# Patient Record
Sex: FEMALE | Race: WHITE | Employment: FULL TIME | URBAN - METROPOLITAN AREA
[De-identification: names, ages, dates, MRNs, and addresses within clinical notes are randomized per-mention and may not be internally consistent; named-entity substitution may affect disease eponyms.]

---

## 2018-09-28 ENCOUNTER — OFFICE VISIT (OUTPATIENT)
Dept: INTERNAL MEDICINE CLINIC | Facility: CLINIC | Age: 43
End: 2018-09-28
Payer: COMMERCIAL

## 2018-09-28 ENCOUNTER — LAB ENCOUNTER (OUTPATIENT)
Dept: LAB | Age: 43
End: 2018-09-28
Attending: FAMILY MEDICINE
Payer: COMMERCIAL

## 2018-09-28 ENCOUNTER — HOSPITAL ENCOUNTER (OUTPATIENT)
Dept: GENERAL RADIOLOGY | Age: 43
Discharge: HOME | End: 2018-09-28
Attending: FAMILY MEDICINE
Payer: COMMERCIAL

## 2018-09-28 VITALS
SYSTOLIC BLOOD PRESSURE: 126 MMHG | WEIGHT: 117 LBS | RESPIRATION RATE: 16 BRPM | HEART RATE: 74 BPM | TEMPERATURE: 98 F | HEIGHT: 63 IN | DIASTOLIC BLOOD PRESSURE: 70 MMHG | BODY MASS INDEX: 20.73 KG/M2

## 2018-09-28 DIAGNOSIS — R42 VERTIGO: ICD-10-CM

## 2018-09-28 DIAGNOSIS — M54.2 NECK PAIN: ICD-10-CM

## 2018-09-28 DIAGNOSIS — R53.83 FATIGUE, UNSPECIFIED TYPE: ICD-10-CM

## 2018-09-28 DIAGNOSIS — M54.2 NECK PAIN: Primary | ICD-10-CM

## 2018-09-28 DIAGNOSIS — Z13.220 SCREENING, LIPID: ICD-10-CM

## 2018-09-28 DIAGNOSIS — R51.9 FREQUENT HEADACHES: ICD-10-CM

## 2018-09-28 DIAGNOSIS — R20.2 TINGLING IN EXTREMITIES: ICD-10-CM

## 2018-09-28 LAB
ALBUMIN SERPL-MCNC: 4.3 G/DL (ref 3.5–4.8)
ALBUMIN/GLOB SERPL: 1.4 {RATIO} (ref 1–2)
ALP LIVER SERPL-CCNC: 44 U/L (ref 37–98)
ALT SERPL-CCNC: 16 U/L (ref 14–54)
ANION GAP SERPL CALC-SCNC: 6 MMOL/L (ref 0–18)
AST SERPL-CCNC: 15 U/L (ref 15–41)
BASOPHILS # BLD AUTO: 0.03 X10(3) UL (ref 0–0.1)
BASOPHILS NFR BLD AUTO: 0.6 %
BILIRUB SERPL-MCNC: 0.5 MG/DL (ref 0.1–2)
BUN BLD-MCNC: 10 MG/DL (ref 8–20)
BUN/CREAT SERPL: 12.8 (ref 10–20)
CALCIUM BLD-MCNC: 9.1 MG/DL (ref 8.3–10.3)
CHLORIDE SERPL-SCNC: 106 MMOL/L (ref 101–111)
CHOLEST SMN-MCNC: 188 MG/DL (ref ?–200)
CO2 SERPL-SCNC: 28 MMOL/L (ref 22–32)
CREAT BLD-MCNC: 0.78 MG/DL (ref 0.55–1.02)
EOSINOPHIL # BLD AUTO: 0.27 X10(3) UL (ref 0–0.3)
EOSINOPHIL NFR BLD AUTO: 5.5 %
ERYTHROCYTE [DISTWIDTH] IN BLOOD BY AUTOMATED COUNT: 11.2 % (ref 11.5–16)
FOLATE SERPL-MCNC: 11.8 NG/ML (ref 5.9–?)
GLOBULIN PLAS-MCNC: 3 G/DL (ref 2.5–4)
GLUCOSE BLD-MCNC: 96 MG/DL (ref 70–99)
HAV AB SERPL IA-ACNC: 394 PG/ML (ref 193–986)
HCT VFR BLD AUTO: 40.1 % (ref 34–50)
HDLC SERPL-MCNC: 68 MG/DL (ref 40–59)
HGB BLD-MCNC: 13.5 G/DL (ref 12–16)
IMMATURE GRANULOCYTE COUNT: 0.02 X10(3) UL (ref 0–1)
IMMATURE GRANULOCYTE RATIO %: 0.4 %
LDLC SERPL CALC-MCNC: 108 MG/DL (ref ?–100)
LYMPHOCYTES # BLD AUTO: 1.43 X10(3) UL (ref 0.9–4)
LYMPHOCYTES NFR BLD AUTO: 28.9 %
M PROTEIN MFR SERPL ELPH: 7.3 G/DL (ref 6.1–8.3)
MCH RBC QN AUTO: 31.4 PG (ref 27–33.2)
MCHC RBC AUTO-ENTMCNC: 33.7 G/DL (ref 31–37)
MCV RBC AUTO: 93.3 FL (ref 81–100)
MONOCYTES # BLD AUTO: 0.32 X10(3) UL (ref 0.1–1)
MONOCYTES NFR BLD AUTO: 6.5 %
NEUTROPHIL ABS PRELIM: 2.87 X10 (3) UL (ref 1.3–6.7)
NEUTROPHILS # BLD AUTO: 2.87 X10(3) UL (ref 1.3–6.7)
NEUTROPHILS NFR BLD AUTO: 58.1 %
NONHDLC SERPL-MCNC: 120 MG/DL (ref ?–130)
OSMOLALITY SERPL CALC.SUM OF ELEC: 289 MOSM/KG (ref 275–295)
PLATELET # BLD AUTO: 253 10(3)UL (ref 150–450)
POTASSIUM SERPL-SCNC: 4.8 MMOL/L (ref 3.6–5.1)
RBC # BLD AUTO: 4.3 X10(6)UL (ref 3.8–5.1)
RED CELL DISTRIBUTION WIDTH-SD: 37.7 FL (ref 35.1–46.3)
SODIUM SERPL-SCNC: 140 MMOL/L (ref 136–144)
TRIGL SERPL-MCNC: 61 MG/DL (ref 30–149)
TSI SER-ACNC: 2.02 MIU/ML (ref 0.35–5.5)
VIT D+METAB SERPL-MCNC: 36.1 NG/ML (ref 30–100)
VLDLC SERPL CALC-MCNC: 12 MG/DL (ref 0–30)
WBC # BLD AUTO: 4.9 X10(3) UL (ref 4–13)

## 2018-09-28 PROCEDURE — 82306 VITAMIN D 25 HYDROXY: CPT | Performed by: FAMILY MEDICINE

## 2018-09-28 PROCEDURE — 82746 ASSAY OF FOLIC ACID SERUM: CPT | Performed by: FAMILY MEDICINE

## 2018-09-28 PROCEDURE — 99204 OFFICE O/P NEW MOD 45 MIN: CPT | Performed by: FAMILY MEDICINE

## 2018-09-28 PROCEDURE — 82607 VITAMIN B-12: CPT | Performed by: FAMILY MEDICINE

## 2018-09-28 PROCEDURE — 72052 X-RAY EXAM NECK SPINE 6/>VWS: CPT | Performed by: FAMILY MEDICINE

## 2018-09-28 PROCEDURE — 80050 GENERAL HEALTH PANEL: CPT | Performed by: FAMILY MEDICINE

## 2018-09-28 PROCEDURE — 80061 LIPID PANEL: CPT | Performed by: FAMILY MEDICINE

## 2018-09-28 RX ORDER — METHOCARBAMOL 750 MG/1
750 TABLET, FILM COATED ORAL 4 TIMES DAILY
Qty: 30 TABLET | Refills: 0 | Status: SHIPPED | OUTPATIENT
Start: 2018-09-28 | End: 2020-01-03 | Stop reason: ALTCHOICE

## 2018-09-28 RX ORDER — MECLIZINE HYDROCHLORIDE 25 MG/1
25 TABLET ORAL 3 TIMES DAILY PRN
Qty: 30 TABLET | Refills: 0 | Status: SHIPPED | OUTPATIENT
Start: 2018-09-28 | End: 2020-01-03 | Stop reason: ALTCHOICE

## 2018-09-28 RX ORDER — METHYLPREDNISOLONE 4 MG/1
TABLET ORAL
Qty: 1 KIT | Refills: 0 | Status: SHIPPED | OUTPATIENT
Start: 2018-09-28 | End: 2019-12-06

## 2018-09-28 NOTE — PROGRESS NOTES
HPI:    Patient ID: Edelmira Green is a 37year old female. HPI Here with multiple complaints. Patient does not see PCP but does see Gyne regularly.  Is up to date per patient on pap and mammogram.   Works as dental hygienist and notices for the [employer] Tobacco comment: 4 cigarettes daily     Substance and Sexual Activity      Alcohol use: No      Drug use: No      Sexual activity: Not on file    Other Topics      Concerns:         Service: Not Asked        Blood Transfusions: Not Asked Allergies:  Sulfa Drugs Cross R*    DIARRHEA    Comment:Abdominal pain   PHYSICAL EXAM:   Physical Exam   Constitutional: She appears well-developed and well-nourished. HENT:   Head: Normocephalic and atraumatic.    Right Ear: Tympanic membrane, external Vitamin D, 25-Hydroxy      Vitamin J11 [E]      Folic Acid Serum [E]      Meds This Visit:  Requested Prescriptions     Signed Prescriptions Disp Refills   • methylPREDNISolone (MEDROL) 4 MG Oral Tablet Therapy Pack 1 kit 0     Sig: As directed.    • me

## 2018-10-01 ENCOUNTER — TELEPHONE (OUTPATIENT)
Dept: INTERNAL MEDICINE CLINIC | Facility: CLINIC | Age: 43
End: 2018-10-01

## 2018-10-01 NOTE — TELEPHONE ENCOUNTER
Called pt back at work as requested, informed she was out to lunch. [facility] Regarding non-urgent results.

## 2018-10-01 NOTE — TELEPHONE ENCOUNTER
Called pt, advised that [facility] has not reviewed her results yet and we will call once her results have been reviewed. Pt verbalized understanding.

## 2018-10-01 NOTE — TELEPHONE ENCOUNTER
Please call patient.  Labs are normal and x-ray just shows some likely muscle strain otherwise normal. Does she want to try PT?

## 2020-01-03 PROBLEM — J18.9 COMMUNITY ACQUIRED PNEUMONIA OF LEFT LUNG, UNSPECIFIED PART OF LUNG: Status: ACTIVE | Noted: 2020-01-03

## 2020-01-03 PROBLEM — R06.02 SOB (SHORTNESS OF BREATH): Status: ACTIVE | Noted: 2020-01-03

## 2020-01-03 PROBLEM — F17.210 CIGARETTE NICOTINE DEPENDENCE WITHOUT COMPLICATION: Status: ACTIVE | Noted: 2020-01-03

## 2020-01-03 PROBLEM — R05.9 COUGH: Status: ACTIVE | Noted: 2020-01-03

## 2020-02-19 PROBLEM — R06.02 SOB (SHORTNESS OF BREATH): Status: RESOLVED | Noted: 2020-01-03 | Resolved: 2020-02-19

## 2020-02-19 PROBLEM — J18.9 COMMUNITY ACQUIRED PNEUMONIA OF LEFT LUNG, UNSPECIFIED PART OF LUNG: Status: RESOLVED | Noted: 2020-01-03 | Resolved: 2020-02-19

## 2020-08-24 PROBLEM — K58.0 IRRITABLE BOWEL SYNDROME WITH DIARRHEA: Status: ACTIVE | Noted: 2020-08-24

## 2024-12-03 RX ORDER — IBUPROFEN 200 MG
200 TABLET ORAL EVERY 6 HOURS PRN
COMMUNITY

## 2024-12-03 RX ORDER — PREDNISONE 20 MG/1
40 TABLET ORAL DAILY
COMMUNITY
Start: 2024-12-02 | End: 2024-12-23

## 2024-12-03 RX ORDER — ALBUTEROL SULFATE 90 UG/1
2 INHALANT RESPIRATORY (INHALATION) EVERY 6 HOURS PRN
COMMUNITY
Start: 2024-12-02

## 2024-12-03 RX ORDER — IPRATROPIUM BROMIDE AND ALBUTEROL SULFATE 2.5; .5 MG/3ML; MG/3ML
3 SOLUTION RESPIRATORY (INHALATION) 4 TIMES DAILY PRN
COMMUNITY
Start: 2024-12-02

## 2024-12-03 RX ORDER — GARLIC EXTRACT 500 MG
1 CAPSULE ORAL DAILY
COMMUNITY

## 2024-12-06 ENCOUNTER — LABORATORY ENCOUNTER (OUTPATIENT)
Dept: LAB | Age: 49
End: 2024-12-06
Attending: OBSTETRICS & GYNECOLOGY
Payer: COMMERCIAL

## 2024-12-06 DIAGNOSIS — N94.6 DYSMENORRHEA: ICD-10-CM

## 2024-12-06 DIAGNOSIS — Z01.818 PRE-OP TESTING: ICD-10-CM

## 2024-12-06 LAB
ANTIBODY SCREEN: NEGATIVE
BASOPHILS # BLD AUTO: 0.02 X10(3) UL (ref 0–0.2)
BASOPHILS NFR BLD AUTO: 0.1 %
EOSINOPHIL # BLD AUTO: 0.01 X10(3) UL (ref 0–0.7)
EOSINOPHIL NFR BLD AUTO: 0.1 %
ERYTHROCYTE [DISTWIDTH] IN BLOOD BY AUTOMATED COUNT: 11.5 %
HCT VFR BLD AUTO: 37.1 %
HGB BLD-MCNC: 12.6 G/DL
IMM GRANULOCYTES # BLD AUTO: 0.1 X10(3) UL (ref 0–1)
IMM GRANULOCYTES NFR BLD: 0.7 %
LYMPHOCYTES # BLD AUTO: 0.94 X10(3) UL (ref 1–4)
LYMPHOCYTES NFR BLD AUTO: 7 %
MCH RBC QN AUTO: 32.1 PG (ref 26–34)
MCHC RBC AUTO-ENTMCNC: 34 G/DL (ref 31–37)
MCV RBC AUTO: 94.6 FL
MONOCYTES # BLD AUTO: 0.12 X10(3) UL (ref 0.1–1)
MONOCYTES NFR BLD AUTO: 0.9 %
NEUTROPHILS # BLD AUTO: 12.24 X10 (3) UL (ref 1.5–7.7)
NEUTROPHILS # BLD AUTO: 12.24 X10(3) UL (ref 1.5–7.7)
NEUTROPHILS NFR BLD AUTO: 91.2 %
PLATELET # BLD AUTO: 304 10(3)UL (ref 150–450)
RBC # BLD AUTO: 3.92 X10(6)UL
RH BLOOD TYPE: POSITIVE
WBC # BLD AUTO: 13.4 X10(3) UL (ref 4–11)

## 2024-12-06 PROCEDURE — 36415 COLL VENOUS BLD VENIPUNCTURE: CPT

## 2024-12-06 PROCEDURE — 85025 COMPLETE CBC W/AUTO DIFF WBC: CPT

## 2024-12-06 PROCEDURE — 86900 BLOOD TYPING SEROLOGIC ABO: CPT

## 2024-12-06 PROCEDURE — 86850 RBC ANTIBODY SCREEN: CPT

## 2024-12-06 PROCEDURE — 86901 BLOOD TYPING SEROLOGIC RH(D): CPT

## 2024-12-23 ENCOUNTER — HOSPITAL ENCOUNTER (OUTPATIENT)
Facility: HOSPITAL | Age: 49
Setting detail: HOSPITAL OUTPATIENT SURGERY
Discharge: HOME | End: 2024-12-23
Attending: OBSTETRICS & GYNECOLOGY | Admitting: OBSTETRICS & GYNECOLOGY
Payer: COMMERCIAL

## 2024-12-23 ENCOUNTER — ANESTHESIA EVENT (OUTPATIENT)
Dept: SURGERY | Facility: HOSPITAL | Age: 49
End: 2024-12-23
Payer: COMMERCIAL

## 2024-12-23 ENCOUNTER — ANESTHESIA (OUTPATIENT)
Dept: SURGERY | Facility: HOSPITAL | Age: 49
End: 2024-12-23
Payer: COMMERCIAL

## 2024-12-23 VITALS
RESPIRATION RATE: 13 BRPM | DIASTOLIC BLOOD PRESSURE: 92 MMHG | BODY MASS INDEX: 21.26 KG/M2 | OXYGEN SATURATION: 100 % | HEIGHT: 63 IN | TEMPERATURE: 99 F | SYSTOLIC BLOOD PRESSURE: 106 MMHG | HEART RATE: 91 BPM | WEIGHT: 120 LBS

## 2024-12-23 DIAGNOSIS — N94.6 DYSMENORRHEA: Primary | ICD-10-CM

## 2024-12-23 DIAGNOSIS — Z90.721 S/P HYSTERECTOMY WITH OOPHORECTOMY: ICD-10-CM

## 2024-12-23 DIAGNOSIS — Z90.710 S/P HYSTERECTOMY WITH OOPHORECTOMY: ICD-10-CM

## 2024-12-23 DIAGNOSIS — Z01.818 PRE-OP TESTING: ICD-10-CM

## 2024-12-23 LAB
B-HCG UR QL: NEGATIVE
RH BLOOD TYPE: POSITIVE

## 2024-12-23 PROCEDURE — 8E0W4CZ ROBOTIC ASSISTED PROCEDURE OF TRUNK REGION, PERCUTANEOUS ENDOSCOPIC APPROACH: ICD-10-PCS | Performed by: OBSTETRICS & GYNECOLOGY

## 2024-12-23 PROCEDURE — 0UT74ZZ RESECTION OF BILATERAL FALLOPIAN TUBES, PERCUTANEOUS ENDOSCOPIC APPROACH: ICD-10-PCS | Performed by: OBSTETRICS & GYNECOLOGY

## 2024-12-23 PROCEDURE — 0UT94ZZ RESECTION OF UTERUS, PERCUTANEOUS ENDOSCOPIC APPROACH: ICD-10-PCS | Performed by: OBSTETRICS & GYNECOLOGY

## 2024-12-23 PROCEDURE — 88307 TISSUE EXAM BY PATHOLOGIST: CPT | Performed by: OBSTETRICS & GYNECOLOGY

## 2024-12-23 PROCEDURE — 81025 URINE PREGNANCY TEST: CPT

## 2024-12-23 PROCEDURE — 0UT24ZZ RESECTION OF BILATERAL OVARIES, PERCUTANEOUS ENDOSCOPIC APPROACH: ICD-10-PCS | Performed by: OBSTETRICS & GYNECOLOGY

## 2024-12-23 RX ORDER — HYDROMORPHONE HYDROCHLORIDE 1 MG/ML
0.2 INJECTION, SOLUTION INTRAMUSCULAR; INTRAVENOUS; SUBCUTANEOUS EVERY 5 MIN PRN
Status: DISCONTINUED | OUTPATIENT
Start: 2024-12-23 | End: 2024-12-23

## 2024-12-23 RX ORDER — HYDROCODONE BITARTRATE AND ACETAMINOPHEN 5; 325 MG/1; MG/1
1 TABLET ORAL ONCE AS NEEDED
Status: DISCONTINUED | OUTPATIENT
Start: 2024-12-23 | End: 2024-12-23

## 2024-12-23 RX ORDER — HYDROMORPHONE HYDROCHLORIDE 1 MG/ML
0.4 INJECTION, SOLUTION INTRAMUSCULAR; INTRAVENOUS; SUBCUTANEOUS EVERY 5 MIN PRN
Status: DISCONTINUED | OUTPATIENT
Start: 2024-12-23 | End: 2024-12-23

## 2024-12-23 RX ORDER — LIDOCAINE HYDROCHLORIDE 10 MG/ML
INJECTION, SOLUTION EPIDURAL; INFILTRATION; INTRACAUDAL; PERINEURAL AS NEEDED
Status: DISCONTINUED | OUTPATIENT
Start: 2024-12-23 | End: 2024-12-23 | Stop reason: SURG

## 2024-12-23 RX ORDER — HYDROCODONE BITARTRATE AND ACETAMINOPHEN 5; 325 MG/1; MG/1
2 TABLET ORAL ONCE AS NEEDED
Status: DISCONTINUED | OUTPATIENT
Start: 2024-12-23 | End: 2024-12-23

## 2024-12-23 RX ORDER — DEXAMETHASONE SODIUM PHOSPHATE 4 MG/ML
VIAL (ML) INJECTION AS NEEDED
Status: DISCONTINUED | OUTPATIENT
Start: 2024-12-23 | End: 2024-12-23 | Stop reason: SURG

## 2024-12-23 RX ORDER — MIDAZOLAM HYDROCHLORIDE 1 MG/ML
INJECTION INTRAMUSCULAR; INTRAVENOUS AS NEEDED
Status: DISCONTINUED | OUTPATIENT
Start: 2024-12-23 | End: 2024-12-23 | Stop reason: SURG

## 2024-12-23 RX ORDER — ONDANSETRON 2 MG/ML
INJECTION INTRAMUSCULAR; INTRAVENOUS AS NEEDED
Status: DISCONTINUED | OUTPATIENT
Start: 2024-12-23 | End: 2024-12-23 | Stop reason: SURG

## 2024-12-23 RX ORDER — KETOROLAC TROMETHAMINE 30 MG/ML
INJECTION, SOLUTION INTRAMUSCULAR; INTRAVENOUS AS NEEDED
Status: DISCONTINUED | OUTPATIENT
Start: 2024-12-23 | End: 2024-12-23 | Stop reason: SURG

## 2024-12-23 RX ORDER — PROCHLORPERAZINE EDISYLATE 5 MG/ML
5 INJECTION INTRAMUSCULAR; INTRAVENOUS EVERY 8 HOURS PRN
Status: DISCONTINUED | OUTPATIENT
Start: 2024-12-23 | End: 2024-12-23

## 2024-12-23 RX ORDER — ACETAMINOPHEN 500 MG
1000 TABLET ORAL ONCE
Status: DISCONTINUED | OUTPATIENT
Start: 2024-12-23 | End: 2024-12-23 | Stop reason: HOSPADM

## 2024-12-23 RX ORDER — SODIUM CHLORIDE, SODIUM LACTATE, POTASSIUM CHLORIDE, CALCIUM CHLORIDE 600; 310; 30; 20 MG/100ML; MG/100ML; MG/100ML; MG/100ML
INJECTION, SOLUTION INTRAVENOUS CONTINUOUS
Status: DISCONTINUED | OUTPATIENT
Start: 2024-12-23 | End: 2024-12-23

## 2024-12-23 RX ORDER — HEPARIN SODIUM 5000 [USP'U]/ML
5000 INJECTION, SOLUTION INTRAVENOUS; SUBCUTANEOUS ONCE
Status: COMPLETED | OUTPATIENT
Start: 2024-12-23 | End: 2024-12-23

## 2024-12-23 RX ORDER — OXYCODONE HYDROCHLORIDE 5 MG/1
5 TABLET ORAL EVERY 4 HOURS PRN
Qty: 10 TABLET | Refills: 0 | Status: SHIPPED | OUTPATIENT
Start: 2024-12-23

## 2024-12-23 RX ORDER — ACETAMINOPHEN 500 MG
1000 TABLET ORAL ONCE AS NEEDED
Status: DISCONTINUED | OUTPATIENT
Start: 2024-12-23 | End: 2024-12-23

## 2024-12-23 RX ORDER — HYDROMORPHONE HYDROCHLORIDE 1 MG/ML
0.6 INJECTION, SOLUTION INTRAMUSCULAR; INTRAVENOUS; SUBCUTANEOUS EVERY 5 MIN PRN
Status: DISCONTINUED | OUTPATIENT
Start: 2024-12-23 | End: 2024-12-23

## 2024-12-23 RX ORDER — ONDANSETRON 2 MG/ML
4 INJECTION INTRAMUSCULAR; INTRAVENOUS EVERY 6 HOURS PRN
Status: DISCONTINUED | OUTPATIENT
Start: 2024-12-23 | End: 2024-12-23

## 2024-12-23 RX ORDER — NALOXONE HYDROCHLORIDE 0.4 MG/ML
0.08 INJECTION, SOLUTION INTRAMUSCULAR; INTRAVENOUS; SUBCUTANEOUS AS NEEDED
Status: DISCONTINUED | OUTPATIENT
Start: 2024-12-23 | End: 2024-12-23

## 2024-12-23 RX ORDER — HYDROMORPHONE HYDROCHLORIDE 1 MG/ML
INJECTION, SOLUTION INTRAMUSCULAR; INTRAVENOUS; SUBCUTANEOUS
Status: COMPLETED
Start: 2024-12-23 | End: 2024-12-23

## 2024-12-23 RX ORDER — SODIUM CHLORIDE 9 MG/ML
INJECTION, SOLUTION INTRAVENOUS CONTINUOUS PRN
Status: DISCONTINUED | OUTPATIENT
Start: 2024-12-23 | End: 2024-12-23 | Stop reason: SURG

## 2024-12-23 RX ORDER — ESTRADIOL 0.05 MG/D
1 PATCH TRANSDERMAL WEEKLY
Qty: 12.85 PATCH | Refills: 3 | Status: SHIPPED | OUTPATIENT
Start: 2024-12-23 | End: 2025-12-23

## 2024-12-23 RX ORDER — ROCURONIUM BROMIDE 10 MG/ML
INJECTION, SOLUTION INTRAVENOUS AS NEEDED
Status: DISCONTINUED | OUTPATIENT
Start: 2024-12-23 | End: 2024-12-23 | Stop reason: SURG

## 2024-12-23 RX ORDER — SCOLOPAMINE TRANSDERMAL SYSTEM 1 MG/1
1 PATCH, EXTENDED RELEASE TRANSDERMAL ONCE
Status: DISCONTINUED | OUTPATIENT
Start: 2024-12-23 | End: 2024-12-23

## 2024-12-23 RX ORDER — BUPIVACAINE HYDROCHLORIDE 2.5 MG/ML
INJECTION, SOLUTION EPIDURAL; INFILTRATION; INTRACAUDAL AS NEEDED
Status: DISCONTINUED | OUTPATIENT
Start: 2024-12-23 | End: 2024-12-23

## 2024-12-23 RX ADMIN — SODIUM CHLORIDE: 9 INJECTION, SOLUTION INTRAVENOUS at 07:29:00

## 2024-12-23 RX ADMIN — KETOROLAC TROMETHAMINE 30 MG: 30 INJECTION, SOLUTION INTRAMUSCULAR; INTRAVENOUS at 09:12:00

## 2024-12-23 RX ADMIN — ROCURONIUM BROMIDE 50 MG: 10 INJECTION, SOLUTION INTRAVENOUS at 07:33:00

## 2024-12-23 RX ADMIN — MIDAZOLAM HYDROCHLORIDE 2 MG: 1 INJECTION INTRAMUSCULAR; INTRAVENOUS at 07:29:00

## 2024-12-23 RX ADMIN — SODIUM CHLORIDE: 9 INJECTION, SOLUTION INTRAVENOUS at 09:14:00

## 2024-12-23 RX ADMIN — ROCURONIUM BROMIDE 20 MG: 10 INJECTION, SOLUTION INTRAVENOUS at 08:34:00

## 2024-12-23 RX ADMIN — LIDOCAINE HYDROCHLORIDE 50 MG: 10 INJECTION, SOLUTION EPIDURAL; INFILTRATION; INTRACAUDAL; PERINEURAL at 07:33:00

## 2024-12-23 RX ADMIN — ONDANSETRON 4 MG: 2 INJECTION INTRAMUSCULAR; INTRAVENOUS at 09:10:00

## 2024-12-23 RX ADMIN — DEXAMETHASONE SODIUM PHOSPHATE 8 MG: 4 MG/ML VIAL (ML) INJECTION at 07:48:00

## 2024-12-23 NOTE — H&P
HPI:  Pt with menometrorrhagia, dysmenorrhea, history of endometriosis and failed endometrial ablation. Here for hysterectomy  SIGNIFICANT HISTORY:  HISTORY:  Past Medical History:    Asthma (HCC)    Endometriosis    IBS (irritable bowel syndrome)    PONV (postoperative nausea and vomiting)      Past Surgical History:   Procedure Laterality Date    Colonoscopy      Endometrial ablation      Tonsillectomy      Tubal ligation        Family History   Problem Relation Age of Onset    Other (cirrhosis) Father     Heart Disease Paternal Grandfather       Social History     Socioeconomic History    Marital status:    Tobacco Use    Smoking status: Every Day     Types: Cigarettes    Smokeless tobacco: Never    Tobacco comments:     4 cigarettes daily    Vaping Use    Vaping status: Never Used   Substance and Sexual Activity    Alcohol use: No     Comment: rare    Drug use: No   Other Topics Concern    Caffeine Concern Yes    Exercise Yes        Past Surgical History:   Procedure Laterality Date    Colonoscopy      Endometrial ablation      Tonsillectomy      Tubal ligation       OB History    Para Term  AB Living   4 3 2 1 1 3   SAB IAB Ectopic Multiple Live Births   0 0 0 0 0     Social History     Socioeconomic History    Marital status:    Tobacco Use    Smoking status: Every Day     Types: Cigarettes    Smokeless tobacco: Never    Tobacco comments:     4 cigarettes daily    Vaping Use    Vaping status: Never Used   Substance and Sexual Activity    Alcohol use: No     Comment: rare    Drug use: No   Other Topics Concern    Caffeine Concern Yes    Exercise Yes         ROS:  GENERAL HEALTH: feels well otherwise  GI: denies nausea, vomiting, constipation, diarrhea; no rectal bleeding; no heartburn  GYNE/: no dysuria, urgency or frequency; no hx abnormal pap smear; no vaginal discharge; no dyspareunia; periods regular; no urinary incontinence  MUSCULOSKELETAL: no joint complaints upper or lower  extremities  PSYCHE: no symptoms of depression or anxiety  HEMATOLOGY: denies hx anemia; denies bruising or excessive bleeding  ENDOCRINE:  denies unexpected wt gain or wt loss  ALLERGY/IMM.: denies food or seasonal allergies    PHYSICAL EXAM:  GENERAL: well developed, well nourished, in no apparent distress  SKIN: no rashes, no suspicious lesions  HEENT: normal  LUNGS: clear to auscultation  CARDIOVASCULAR: normal S1, S2, RRR  ABDOMEN: Soft, non distended; non tender, no masses  GYNE/: External Genitalia: Normal                      Vagina: normal                      Uterus: av, mobile, non tender, small                     Cervix: pink and clear, no lesions                     Adnexa: non tender, no masses  EXTREMITIES:  non tender without edema    Imaging:  Pelvic ultrasound 11/24-  UTERUS:    The uterus is anteverted and measures 7.2 x 3.9 x 3.6 cm. Total volume is 52.9 mL.   The myometrium is homogeneous.   The endometrial echo complex measures up to 5.0 mm. Numerous calcifications seen within the lower   endometrium.     RIGHT OVARY:   The right ovary measures 2.3 x 1.1 x 1.6 cm. Total volume is 2.1 mL.        LEFT OVARY:   The left ovary measures 1.7 x 1.0 x 1.4 cm. Total volume is 1.3 mL.       There is no significant free fluid.     ASSESMENT/PLAN:  1. Surgery to be performed: robotic assisted total laparoscopic hysterectomy, bilat oophorectomy  2. Indication:menometrorrhagia, dysmenorrhea, history of endometriosis  3. The procedure, its risks, benefits, possible complications and alternatives discussed with the patient.  She understands and agrees to the procedure.      Id#[id number]

## 2024-12-23 NOTE — ANESTHESIA POSTPROCEDURE EVALUATION
[facility]    Karolina Hernandezmantle Patient Status:  Hospital Outpatient Surgery   Age/Gender 49 year old female MRN [id number]   Location [facility] POST ANESTHESIA CARE UNIT Attending Yuan Subramanian MD   Hosp Day # 0 PCP Diane Casey MD       Anesthesia Post-op Note    XI ROBOT-ASSISTED TOTAL LAPAROSCOPIC HYSTERECTOMY BILATERAL OOPHORECTOMY, bilateral partial salpingectomy    Procedure Summary       Date: 12/23/24 Room / Location:  MAIN OR 09 / [facility] MAIN OR    Anesthesia Start: 0729 Anesthesia Stop: 0934    Procedure: XI ROBOT-ASSISTED TOTAL LAPAROSCOPIC HYSTERECTOMY BILATERAL OOPHORECTOMY, bilateral partial salpingectomy (Bilateral: Uterus) Diagnosis: (MENOMETRORRAHIADYSMENORRHEAHISTORY OF ENDOMETRIOSIS)    Surgeons: Yuan Subramanian MD Anesthesiologist: Casey Hong MD    Anesthesia Type: general ASA Status: 2            Anesthesia Type: general    Vitals Value Taken Time   /86 12/23/24 0946   Temp 98.5 °F (36.9 °C) 12/23/24 0946   Pulse 101 12/23/24 0946   Resp 18 12/23/24 0946   SpO2 100 % 12/23/24 0946       Patient Location: PACU    Anesthesia Type: general    Airway Patency: patent    Postop Pain Control: adequate    Mental Status: preanesthetic baseline    Nausea/Vomiting: none    Cardiopulmonary/Hydration status: stable euvolemic    Complications: no apparent anesthesia related complications    Postop vital signs: stable    Dental Exam: Unchanged from Preop

## 2024-12-23 NOTE — OPERATIVE REPORT
OPERATIVE REPORT   DATE OF SURGERY: 12/23/24  PREOPERATIVE DIAGNOSIS:  menometrorrhagia, dysmenorrhea, endometriosis, failed endometrial ablation  POSTOPERATIVE DIAGNOSIS:  same  PROCEDURE PERFORMED: Robotically assisted total laparoscopic hysterectomy, bilateral salpingectomies  SURGEON; Moris  ASSISTANT:  LUCILLE Borja  ANESTHESIA: General.   FINDINGS: Uterus is approximately 8 week size.   The tubes were partially resected and ovaries were normal bilaterally. The peritoneum was normal except small endometriosis implant in R ovarian fossa including pelvic, veiscouterine and cul de sac.    TECHNIQUE: The surgical assist, Dr. Borja, was an integral part of the procedure. They were necessary to provide a safe outcome and to aid in adequate hemostasis.The patient was prepped and draped in a sterile fashion after satisfactory general anesthesia was given. Patient was examined. A Torrez was placed in the bladder. An advincula manipulator was inserted into the uterus and secured with an 0 Vicryl suture.  A stab incision was made infraumbilically in the skin with a scalpel and a Veress needle was then introduced into the abdominal cavity. The abdomen was insufflated until 15 mm of pressure was reached. The Veress needle was then removed. An 8-mm incision was made approximately 4 cm above the umbilicus and a 8-mm trocar was placed. The laparoscope was introduced.  Ureters were visualized bilaterally.   Left and right 8 mm trocars were placed at the level of the umbilicus using local anesthetic and direct visualization. A 5 mm RUQ assistant port was then placed under visualization.  The robot was docked to the camera and two 8 mm ports.     Bilat ureters were identified away from the operative field. A small endometriosis implant was noted in the R ovarian fossa. It was fulgurated with bipolars. The left IP ligament was elevated, coagulated with bipolars and cut. Leaving the ovary and remaining tube connected to the  uterus. The left round ligament was coagulated and cut. The anterior leaf of the broad ligament was lifted and incised to the midline. The uterine artery and vein on the side were identified. They were thoroughly coagulated and cut. The right IP ligament was isolated, coagulated and cut, again leaving the tube and ovary attached to the the uterus. The round ligament was coagulated and cut. The anterior leaf of the broad ligament was lifted and incised in the midline to connect with the other side. The bladder was dissected off the anterior uterus and cervix by blunt dissection. The uterine artery and vein on this side were skeletonized, coagulated, and cut. The uterus was noted to be blanching nicely. Additional dissection was undertaken to uncover the white fascia. The fascia was then incised in the midline anteriorly and the colpotomy cup was visualized. The dissection was then carried posteriorly until the cervix was freed from the vagina.  The uterus was removed by vagina.  The vaginal cuff was closed using 0 v-lock suture in a double layer fashion.   The cuff closure needle was removed.    The abdomen was then re-insufflated and inspected.  The pelvis was irrigated and hemostasis was noted to be excellent.  The robot was detached from the instruments and undocked.     The gas was then allowed to escape from the abdomen. Incisions were closed with subcuticular 4-0 Monocryl suture and Dermabond.   The Torrez catheter was removed. The vagina was inspected with cuff intact and no active vaginal bleeding. The patient tolerated the procedure well with 100mL blood loss and went to recovery in good condition.

## 2024-12-23 NOTE — SPIRITUAL CARE NOTE
Spiritual Care Visit Note    Patient Name: Karolina Marion Date of Spiritual Care Visit: 24   : 1975 Primary Dx: <principal problem not specified>       Referred By:      Spiritual Care Taxonomy:    Intended Effects: Build relationship of care and support    Methods: Offer support;Offer spiritual/Hoahaoism support    Interventions: Active listening;Ask guided questions    Visit Type/Summary:     - Spiritual Care: Patient and family expressed appreciation for  visit. Patient declined a  visit at this time.  remains available as needed for follow up.    Spiritual Care support can be requested via an Epic consult. For urgent/immediate needs, please contact the On Call  at: [facility]: ext [phone]    Pr. Mary Muniz Mdiv.

## 2024-12-23 NOTE — ANESTHESIA PREPROCEDURE EVALUATION
PRE-OP EVALUATION    Patient Name: Karolina Marion    Admit Diagnosis: MENOMETRORRAHIA  DYSMENORRHEA  HISTORY OF ENDOMETRIOSIS    Pre-op Diagnosis: MENOMETRORRAHIA  DYSMENORRHEA  HISTORY OF ENDOMETRIOSIS    XI ROBOT-ASSISTED TOTAL LAPAROSCOPIC HYSTERECTOMY BILATERAL OOPHORECTOMY    Anesthesia Procedure: XI ROBOT-ASSISTED TOTAL LAPAROSCOPIC HYSTERECTOMY BILATERAL OOPHORECTOMY (Bilateral)    Surgeons and Role:     * Yuan Subramanian MD - Primary     * Dianna Borja MD - Assisting Surgeon    Pre-op vitals reviewed.        Body mass index is 20.37 kg/m².    Current medications reviewed.  Hospital Medications:  • acetaminophen (Tylenol Extra Strength) tab 1,000 mg  1,000 mg Oral Once   • scopolamine (Transderm-Scop) 1 MG/3DAYS patch 1 patch  1 patch Transdermal Once   • lactated ringers infusion   Intravenous Continuous   • heparin (Porcine) 5000 UNIT/ML injection 5,000 Units  5,000 Units Subcutaneous Once   • ceFAZolin (Ancef) 2g in 10mL IV syringe premix  2 g Intravenous Once       Outpatient Medications:   Prescriptions Prior to Admission[1]    Allergies: Sulfa drugs cross reactors      Anesthesia Evaluation    Patient summary reviewed.    Anesthetic Complications  (+) history of anesthetic complications  History of: PONV       GI/Hepatic/Renal    Negative GI/hepatic/renal ROS.                             Cardiovascular    Negative cardiovascular ROS.    Exercise tolerance: good     MET: >4                                           Endo/Other    Negative endo/other ROS.                              Pulmonary      (+) asthma (mild)                     Neuro/Psych    Negative neuro/psych ROS.                                Past Surgical History:   Procedure Laterality Date   • Colonoscopy     • Endometrial ablation     • Tonsillectomy     • Tubal ligation       Social History     Socioeconomic History   • Marital status:    Tobacco Use   • Smoking status: Every Day     Types: Cigarettes   •  Smokeless tobacco: Never   • Tobacco comments:     4 cigarettes daily    Vaping Use   • Vaping status: Never Used   Substance and Sexual Activity   • Alcohol use: No     Comment: rare   • Drug use: No   Other Topics Concern   • Caffeine Concern Yes   • Exercise Yes     History   Drug Use No     Available pre-op labs reviewed.  Lab Results   Component Value Date    WBC 13.4 (H) 2024    RBC 3.92 2024    HGB 12.6 2024    HCT 37.1 2024    MCV 94.6 2024    MCH 32.1 2024    MCHC 34.0 2024    RDW 11.5 2024    .0 2024               Airway      Mallampati: I  Mouth opening: >3 FB  TM distance: > 6 cm  Neck ROM: full Cardiovascular    Cardiovascular exam normal.  Rhythm: regular  Rate: normal  (-) murmur   Dental    Dentition appears grossly intact         Pulmonary    Pulmonary exam normal.  Breath sounds clear to auscultation bilaterally.               Other findings        ASA: 2   Plan: general  NPO status verified and patient meets guidelines.        Comment: GETA discussed in detail.  Risk of sore throat, cough, dental trauma, PONV discussed.  Patient expresses understanding and wishes to proceed. All questions answered.    Plan/risks discussed with: patient and spouse            Present on Admission:  **None**             [1]   Medications Prior to Admission   Medication Sig Dispense Refill Last Dose/Taking   • albuterol 108 (90 Base) MCG/ACT Inhalation Aero Soln Inhale 2 puffs into the lungs every 6 (six) hours as needed.   Taking As Needed   • [] amoxicillin clavulanate 875-125 MG Oral Tab Take 1 tablet by mouth 2 (two) times daily.   Taking   • ipratropium-albuterol 0.5-2.5 (3) MG/3ML Inhalation Solution Inhale 3 mL into the lungs 4 (four) times daily as needed.   Taking As Needed   • [] predniSONE 20 MG Oral Tab Take 2 tablets (40 mg total) by mouth daily.   Taking   • Multiple Vitamin (MULTIVITAMIN ADULT OR) Take by mouth.   Taking   •  acidophilus-pectin Oral Cap Take 1 capsule by mouth daily.   Taking   • diphenhydrAMINE HCl 50 MG Oral Tab Take 1 tablet (50 mg total) by mouth nightly as needed for Itching.   Taking As Needed   • ibuprofen (ADVIL) 200 MG Oral Tab Take 1 tablet (200 mg total) by mouth every 6 (six) hours as needed for Pain.   Taking As Needed

## 2024-12-23 NOTE — DISCHARGE INSTRUCTIONS
Post op Instructions  Hysterectomy    Magalie He, Moris, Jennifer, Liliana Aponte,  Mitesh, Gerry, Sherin, Weeks    Thank you for choosing us for your gynecologic care. These instructions are a guide for you as you recovery from your recent surgery. Please feel free to call or contact us by Desmos with your questions. Keep in mind that Desmos messages are only answered during normal business hours.    Pain Control: It is normal and expected to have pain after surgery. You may experience some pelvic cramping, abdominal discomfort, and incisional pain. You may also experience a sore throat, shoulder discomfort, or muscle aches. Your doctor will give you a prescription for a narcotic pain medication.  We recommend you take 600 mg Ibuprofen (Motrin or Advil) every 6 hours, alternating with 1-2 of the narcotic every 6 hours, so you are taking something every 3 hours. If you are unable to take Ibuprofen, you can substitute 2 Acetaminophen/Tylenol. Keep in mind that most narcotics also contain Acetaminophen and DO NOT exceed 4 grams (4000 mg) of Acetaminophen daily.   Most patients only need narcotic pain medications for about a week. If you are still requiring narcotic medications after 2 weeks, please contact our office.     You may experience nausea from general anesthesia and/or prescription pain medications. Your doctor may give you a prescription to help with the nausea.    Activity:  You should rest frequently for the first week after surgery, gradually increasing your activity as you feel better. We encourage you to walk around the house every couple hours to prevent complications, such as blood clots in your legs or constipation. It is okay to walk up and down stairs when you get home.    Showering is fine the day after surgery.  The dressings can be removed after 24 hours.  If your surgeon used skin glue, the glue will wear off on it’s own within a week or two. You may wear a band-aid or small  dressing if desired. Please clean the incisions gently with mild soap and water as needed.      No driving for 1-2 weeks after surgery. You should be off prescription pain medications for 24 hours before driving.     Certain activities need to be avoided until approved by your physician at the post   operative visit. NO heavy lifting (nothing more than a gallon of milk), heavy exercise, douching, or intercourse. DO NOT use tampons for vaginal spotting.  Vaginal spotting is normal for up to 6 weeks as there are stitches at the top of the vagina.    If you were using vaginal estrogen prior to surgery, DO NOT resume this until your physician has examined you and authorized its use again.     Bladder/bowel function: It is normal for your bladder to feel different after hysterectomy. If you have pain during urination that persists more than a few days after surgery, or starts within a few days of surgery, please call our office.      It is important to prevent constipation in the postoperative period.  Both anesthesia and narcotics can contribute to this. We encourage you to use a fiber supplement, stool softener, or miralax as needed to prevent constipation. Avoid other medications that can cause constipation, such as calcium supplements, until bowel function is normal again.    Diet: You may resume your normal diet once your appetite returns after surgery. Some patients experience nausea from anesthesia or narcotics: we recommend you start with a light diet. Do not drink alcohol or use other sedating medications (sleep aids, sedating cold medications) if taking prescription pain medications.    Resume normal medications as instructed.      Please call our office if you experience any of the following symptoms:   Temperature over 100.5 degrees   Vaginal bleeding heavier than a moderate period   Significant swelling, redness, or discharge at the incision sites   Severe abdominal/pelvic pain   Shortness of breath or chest  pain   New onset of leg pain and /or swelling     If a specimen was sent to pathology, you can expect a call from your doctor  within 10 days with the report.    You should have a post op appointment in 2 weeks.   Please call with any other questions or concerns.  Office Number: [phone]    You received a drug called Toradol which is an Anti Inflammatory at: 9:15 am     Do not take any Anti Inflammatory like Motrin, Advil, Aleve or Ibuprofen until after: 3:15 pm   Please report any suspected allergic reactions or bleeding issues to your doctor

## 2024-12-23 NOTE — ANESTHESIA PROCEDURE NOTES
Airway  Date/Time: 12/23/2024 7:35 AM  Urgency: elective      General Information and Staff    Patient location during procedure: OR  Anesthesiologist: Casey Hong MD  Resident/CRNA: Zuly Davies CRNA  Performed: CRNA   Performed by: Zuly Davies CRNA  Authorized by: Casey Hong MD      Indications and Patient Condition  Indications for airway management: anesthesia  Sedation level: deep  Preoxygenated: yes  Patient position: sniffing  Mask difficulty assessment: 1 - vent by mask    Final Airway Details  Final airway type: endotracheal airway      Successful airway: ETT  Cuffed: yes   Successful intubation technique: direct laryngoscopy  Facilitating devices/methods: intubating stylet  Endotracheal tube insertion site: oral  Blade: Maueren  Blade size: #3  ETT size (mm): 7.0    Cormack-Lehane Classification: grade I - full view of glottis  Placement verified by: capnometry   Measured from: lips  ETT to lips (cm): 21  Number of attempts at approach: 1

## (undated) DEVICE — AIRSEAL TRI-LUMEN LILTERED TUBE SET: Brand: AIRSEAL

## (undated) DEVICE — GLOVE SUR 7.5 SENSICARE PI PIP CRM PWD F

## (undated) DEVICE — 40580 - THE PINK PAD - ADVANCED TRENDELENBURG POSITIONING KIT: Brand: 40580 - THE PINK PAD - ADVANCED TRENDELENBURG POSITIONING KIT

## (undated) DEVICE — BLADELESS OBTURATOR: Brand: WECK VISTA

## (undated) DEVICE — COLUMN DRAPE

## (undated) DEVICE — MEGA SUTURECUT ND: Brand: ENDOWRIST

## (undated) DEVICE — GYN LAP/ROBOTIC: Brand: MEDLINE INDUSTRIES, INC.

## (undated) DEVICE — ADHESIVE SKIN TOP FOR WND CLSR DERMBND ADV

## (undated) DEVICE — ANTIBACTERIAL VIOLET BRAIDED (POLYGLACTIN 910), SYNTHETIC ABSORBABLE SUTURE: Brand: COATED VICRYL

## (undated) DEVICE — LAPAROVUE VISIBILITY SYSTEM LAPAROSCOPIC SOLUTIONS: Brand: LAPAROVUE

## (undated) DEVICE — SEAL

## (undated) DEVICE — STERILE DRAPE FOR USE WITH SITUATE ROOM SCANNER: Brand: SITUATE

## (undated) DEVICE — INSUFFLATION NEEDLE TO ESTABLISH PNEUMOPERITONEUM.: Brand: INSUFFLATION NEEDLE

## (undated) DEVICE — SYRINGE MED 50ML LL TIP DISP

## (undated) DEVICE — ABSORBABLE WOUND CLOSURE DEVICE: Brand: SYNETURE

## (undated) DEVICE — MONOPOLAR CURVED SCISSORS: Brand: ENDOWRIST

## (undated) DEVICE — ARM DRAPE

## (undated) DEVICE — MANIPULATOR UTER 3.5CM ULTEM PLAS CUP

## (undated) DEVICE — GLOVE SUR 8 SENSICARE PI PIP CRM PWD F

## (undated) DEVICE — SUT MCRYL 4-0 18IN PS-2 ABSRB UD 19MM 3/8 CIR

## (undated) DEVICE — TIP COVER ACCESSORY

## (undated) DEVICE — SLEEVE COMPR MD KNEE LEN SGL USE KENDALL SCD

## (undated) DEVICE — AIRSEAL 5 MM ACCESS PORT AND LOW PROFILE OBTURATOR WITH BLADELESS OPTICAL TIP, 120 MM LENGTH: Brand: AIRSEAL

## (undated) DEVICE — BIPOLAR GRASPER, LONG: Brand: ENDOWRIST